# Patient Record
Sex: FEMALE | Race: WHITE | Employment: OTHER | URBAN - METROPOLITAN AREA
[De-identification: names, ages, dates, MRNs, and addresses within clinical notes are randomized per-mention and may not be internally consistent; named-entity substitution may affect disease eponyms.]

---

## 2020-10-26 ENCOUNTER — TELEPHONE (OUTPATIENT)
Dept: NEUROLOGY | Age: 70
End: 2020-10-26

## 2020-10-26 ENCOUNTER — OFFICE VISIT (OUTPATIENT)
Dept: FAMILY MEDICINE CLINIC | Age: 70
End: 2020-10-26
Payer: COMMERCIAL

## 2020-10-26 VITALS
HEIGHT: 66 IN | SYSTOLIC BLOOD PRESSURE: 166 MMHG | TEMPERATURE: 96.3 F | RESPIRATION RATE: 16 BRPM | WEIGHT: 164.6 LBS | OXYGEN SATURATION: 94 % | HEART RATE: 78 BPM | DIASTOLIC BLOOD PRESSURE: 85 MMHG | BODY MASS INDEX: 26.45 KG/M2

## 2020-10-26 DIAGNOSIS — R41.3 MEMORY CHANGES: ICD-10-CM

## 2020-10-26 DIAGNOSIS — I10 ESSENTIAL HYPERTENSION: ICD-10-CM

## 2020-10-26 DIAGNOSIS — Z13.5 SCREENING FOR GLAUCOMA: ICD-10-CM

## 2020-10-26 DIAGNOSIS — Z12.83 SCREENING FOR MALIGNANT NEOPLASM OF SKIN: ICD-10-CM

## 2020-10-26 DIAGNOSIS — Z12.31 SCREENING MAMMOGRAM, ENCOUNTER FOR: Primary | ICD-10-CM

## 2020-10-26 PROBLEM — G47.09 OTHER INSOMNIA: Status: ACTIVE | Noted: 2020-10-26

## 2020-10-26 PROCEDURE — 99202 OFFICE O/P NEW SF 15 MIN: CPT | Performed by: FAMILY MEDICINE

## 2020-10-26 RX ORDER — TRAZODONE HYDROCHLORIDE 50 MG/1
TABLET ORAL
COMMUNITY
Start: 2020-10-15 | End: 2022-02-24 | Stop reason: ALTCHOICE

## 2020-10-26 NOTE — PROGRESS NOTES
Michael Morin is a 65 y.o. female who presents today with the following:    HPI  Chief Complaint   Patient presents with   [facility] Patient     To be established       1. Screening mammogram, encounter for  Not up-to-date on mammogram    2. Memory changes  Reports memory changes. She was living in [place] for 4 years and recently moved to [place]. Current symptoms include, losing things in the house, sometimes sits in the car and does not remember where she has to go, only drives inside her neighborhood, currently lives alone but has a son who keeps contact, has been lost while driving. Does not forget names of family members. Requests neurology referral for further evaluation. Has never had CT scan for the evaluation of memory changes. 3. Screening for glaucoma  Needs ophthalmology referral    4. Screening for malignant neoplasm of skin  Needs dermatology referral    5. Essential hypertension  Not taking blood pressure medication. Exercises moderately. Takes 6000 steps daily. Takes aspirin daily. Review of Systems   Constitutional: Negative. HENT: Negative. Eyes: Negative. Respiratory: Negative. Cardiovascular: Negative. Gastrointestinal: Negative. Genitourinary: Negative. Musculoskeletal: Negative. Skin: Negative. Neurological: Negative. Endo/Heme/Allergies: Negative. Psychiatric/Behavioral: Positive for memory loss. The patient has insomnia. Physical Exam  Vitals signs and nursing note reviewed. HENT:      Head: Normocephalic and atraumatic. Right Ear: External ear normal.      Left Ear: External ear normal.      Nose: Nose normal.      Mouth/Throat:      Pharynx: No oropharyngeal exudate. Eyes:      Extraocular Movements: Extraocular movements intact. Conjunctiva/sclera: Conjunctivae normal.      Pupils: Pupils are equal, round, and reactive to light. Neck:      Musculoskeletal: Normal range of motion and neck supple.       Thyroid: No thyromegaly. Cardiovascular:      Rate and Rhythm: Normal rate and regular rhythm. Pulmonary:      Effort: Pulmonary effort is normal.      Breath sounds: Normal breath sounds. Abdominal:      General: Bowel sounds are normal. There is no distension. Palpations: Abdomen is soft. Tenderness: There is no abdominal tenderness. Musculoskeletal: Normal range of motion. Right lower leg: No edema. Left lower leg: No edema. Lymphadenopathy:      Cervical: No cervical adenopathy. Skin:     General: Skin is warm and dry. Neurological:      Mental Status: She is alert and oriented to person, place, and time. Psychiatric:         Mood and Affect: Mood and affect normal.       BP (!) 166/85   Pulse 78   Temp (!) 96.3 °F (35.7 °C) (Oral)   Resp 16   Ht 5' 6\" (1.676 m)   Wt 164 lb 9.6 oz (74.7 kg)   SpO2 94%   BMI 26.57 kg/m²     No Known Allergies    Current Outpatient Medications   Medication Sig    traZODone (DESYREL) 50 mg tablet TAKE ONE TABLET BY MOUTH AT BEDTIME AS NEEDED     No current facility-administered medications for this visit. History reviewed. No pertinent past medical history. History reviewed. No pertinent surgical history. Problem List  Date Reviewed: 10/26/2020          Codes Class Noted    Other insomnia ICD-10-CM: G47.09  ICD-9-CM: 780.52  10/26/2020               No results found for this visit on 10/26/20.      1. Screening mammogram, encounter for    - [facility] MAMMO BI SCREENING INCL CAD; Future    2. Memory changes    - REFERRAL TO NEUROLOGY    3. Screening for glaucoma    - REFERRAL TO OPHTHALMOLOGY    4. Screening for malignant neoplasm of skin    - REFERRAL TO DERMATOLOGY    5. Essential hypertension  Ms. Jerson Lucia has been given the following recommendations today due to her elevated BP reading: rescreen BP within a minimum of 2 weeks. Follow-up and Dispositions    · Return in about 3 months (around 1/26/2021) for follow up.            I ADVISED PATIENT TO GO TO ER IF SYMPTOMS WORSEN , CHANGE OR FAILS TO IMPROVE. I have discussed the diagnosis with the patient and the intended plan as seen in the above orders. The patient has received an after-visit summary and questions were answered concerning future plans. I have discussed medication side effects and warnings with the patient as well. The patient agrees and understands above plan.            Opal Beth MD

## 2020-10-26 NOTE — PROGRESS NOTES
Patient stated name &     Chief Complaint   Patient presents with    New Patient     To be established        Health Maintenance Due   Topic    Hepatitis C Screening     DTaP/Tdap/Td series (1 - Tdap)    Lipid Screen     Shingrix Vaccine Age 50> (1 of 2)    Colorectal Cancer Screening Combo     Breast Cancer Screen Mammogram     GLAUCOMA SCREENING Q2Y     Bone Densitometry (Dexa) Screening     Pneumococcal 65+ years (1 of 1 - PPSV23)    Flu Vaccine (1)       Wt Readings from Last 3 Encounters:   10/26/20 164 lb 9.6 oz (74.7 kg)     Temp Readings from Last 3 Encounters:   10/26/20 (!) 96.3 °F (35.7 °C) (Oral)     BP Readings from Last 3 Encounters:   10/26/20 (!) 166/85     Pulse Readings from Last 3 Encounters:   10/26/20 78         Learning Assessment:  :     No flowsheet data found. Depression Screening:  :     3 most recent PHQ Screens 10/26/2020   Little interest or pleasure in doing things Not at all   Feeling down, depressed, irritable, or hopeless Not at all   Total Score PHQ 2 0       Fall Risk Assessment:  :     Fall Risk Assessment, last 12 mths 10/26/2020   Able to walk? Yes   Fall in past 12 months? No       Abuse Screening:  :     No flowsheet data found. Coordination of Care Questionnaire:  :     1) Have you been to an emergency room, urgent care clinic since your last visit? No    Hospitalized since your last visit? No             2) Have you seen or consulted any other health care providers outside of [address] [place] [address] since your last visit? No  (Include any pap smears or colon screenings in this section.)    Patient is accompanied by self I have received verbal consent from Gaby Couch to discuss any/all medical information while they are present in the room.

## 2020-10-29 ENCOUNTER — OFFICE VISIT (OUTPATIENT)
Dept: NEUROLOGY | Age: 70
End: 2020-10-29
Payer: COMMERCIAL

## 2020-10-29 VITALS
TEMPERATURE: 97.2 F | SYSTOLIC BLOOD PRESSURE: 122 MMHG | HEIGHT: 66 IN | BODY MASS INDEX: 26.45 KG/M2 | RESPIRATION RATE: 16 BRPM | DIASTOLIC BLOOD PRESSURE: 70 MMHG | WEIGHT: 164.6 LBS | HEART RATE: 67 BPM | OXYGEN SATURATION: 98 %

## 2020-10-29 DIAGNOSIS — R41.3 MEMORY LOSS: Primary | ICD-10-CM

## 2020-10-29 DIAGNOSIS — R68.89 FORGETFULNESS: ICD-10-CM

## 2020-10-29 DIAGNOSIS — R41.3 MEMORY LOSS: ICD-10-CM

## 2020-10-29 PROCEDURE — 99204 OFFICE O/P NEW MOD 45 MIN: CPT | Performed by: PSYCHIATRY & NEUROLOGY

## 2020-10-29 RX ORDER — BISMUTH SUBSALICYLATE 262 MG
1 TABLET,CHEWABLE ORAL DAILY
COMMUNITY

## 2020-10-29 RX ORDER — DIPHENHYDRAMINE HCL 25 MG
25 CAPSULE ORAL
COMMUNITY

## 2020-10-29 RX ORDER — CYANOCOBALAMIN (VITAMIN B-12) 2000 MCG
TABLET ORAL
COMMUNITY

## 2020-10-29 RX ORDER — ACETAMINOPHEN, DIPHENHYDRAMINE HCL, PHENYLEPHRINE HCL 325; 25; 5 MG/1; MG/1; MG/1
TABLET ORAL
COMMUNITY

## 2020-10-29 RX ORDER — GUAIFENESIN 100 MG/5ML
81 LIQUID (ML) ORAL DAILY
COMMUNITY

## 2020-10-29 RX ORDER — CHOLECALCIFEROL (VITAMIN D3) 125 MCG
CAPSULE ORAL
COMMUNITY

## 2020-10-29 NOTE — PROGRESS NOTES
[facility] and [address] at [facility] at [facility], [address] [facility] [address] [address] [facility], [address] [place] [address]  [phone] Office  [phone] Facsimile           Referring: Colby Amanda MD      Chief Complaint   Patient presents with    New Patient    Memory Loss     forgetfulness x 3r     23-year-old lady presents today for initial neurologic consultation regarding difficulty with memory. She tells me that she has had ongoing memory difficulty for maybe a year or so but worsening over the last 4 months. No inciting factor. No changes. No illness. No changes in medicine. She notes that she is really just forgetful. She is increasingly frustrated by putting something down in her home and then not remembering where she put it. She will go into a store and not remember why she was there. Sometimes cannot remember where she was wanting to go but she does not get lost.  No difficulty with driving. No accidents or near accidents. She remembers people in the neighborhood by name. She has not been told that she repeats things or forgets conversations. She does not forget medication. No difficulty with paying her bills. Her son, Lolis Zaragoza, is concerned and is noticed some things and he asked her to get evaluated. Her mother perhaps had some memory difficulty later in life. Her father did not. She has not had any difficulty with blood pressure chest pain shortness of breath. Notes that lungs kidney function etc. all been fine. She is a retired oncology nurse. She did move down to [place] when her son was being deployed to [facility] and she stayed there with his family and then when he returned she moved back to [place]. She was raised in [place]. She is no longer nursing.     Review of the electronic medical record finds office note from Dr. Brett Coon dated 10/26/2020 where she came in as a new patient to establish care. She reported memory changes including losing things in her house, not remembering where she has to go etc.  She had no previous history of neurologic work-up for the same. She was hence referred. No past medical history on file. Hypertension    No past surgical history on file. Current Outpatient Medications   Medication Sig Dispense Refill    melatonin 10 mg tab Take  by mouth.  aspirin 81 mg chewable tablet Take 81 mg by mouth daily.  cyanocobalamin, vitamin B-12, 2,000 mcg tab Take  by mouth.  cholecalciferol, vitamin D3, 50 mcg (2,000 unit) tab Take  by mouth.  multivitamin (ONE A DAY) tablet Take 1 Tab by mouth daily.  diphenhydrAMINE (BenadryL) 25 mg capsule Take 25 mg by mouth every six (6) hours as needed.  traZODone (DESYREL) 50 mg tablet TAKE ONE TABLET BY MOUTH AT BEDTIME AS NEEDED          No Known Allergies    Social History     Tobacco Use    Smoking status: Never Smoker    Smokeless tobacco: Never Used   Substance Use Topics    Alcohol use: Never     Frequency: Never    Drug use: Never       Family History   Problem Relation Age of Onset    Heart Disease Mother     Cancer Father        Review of Systems  Pertinent positives and negatives as noted with remainder of comprehensive review negative    Examination  Visit Vitals  /70   Pulse 67   Temp 97.2 °F (36.2 °C)   Resp 16   Ht 5' 6\" (1.676 m)   Wt 74.7 kg (164 lb 9.6 oz)   SpO2 98%   BMI 26.57 kg/m²     Pleasant, well appearing. Dress and grooming are appropriate. No scleral icterus is present. Oropharynx is clear. Supple neck without bruit appreciated. Heart regular. Pulses are symmetrical.  No edema in the lower extremities. Neurologically, she is awake, alert, and oriented with normal speech and language. Her cognition is intact to discussion of medical history and current events. Follows all commands. No right left confusion. Intact cranial nerves 2-12. No nystagmus. Visual fields full to confrontation. Disk margins are flat bilaterally. She has normal bulk and tone. She has no abnormal movement. She has no pronation or drift. She generates full strength in the upper and lower extremities to direct confrontational testing. Reflexes are symmetrical in the upper and lower extremities bilaterally. No pathologic reflexes are elicited. .  Finger nose finger and rapid alternating movements are normal.  Steady gait. No sensory deficit to primary modalities. Impression/Plan  64-year-old lady with progressive memory difficulty and forgetfulness and differential diagnosis includes age-related cognitive decline versus mild cognitive impairment versus early dementing process versus attention versus structural versus vascular versus metabolic versus other. Proceed with MRI of the brain, carotid Doppler, EEG as well as B12 and thyroid levels. Formal neuropsychological evaluation with Dr. Frida Randle. Follow-up after testing    Lara Encarnacion MD    This note was created using voice recognition software. Despite editing, there may be syntax errors.

## 2020-10-29 NOTE — LETTER
10/29/20 Patient: Moni Delacruz YOB: 1950 Date of Visit: 10/29/2020 Gildardo Schultz MD 
[address] [place] [id number] VIA In Basket Dear Gildardo Schultz MD, Thank you for referring Ms. Moni Delacruz to [facility] for evaluation. My notes for this consultation are attached. If you have questions, please do not hesitate to call me. I look forward to following your patient along with you. Sincerely, Jie Arriola MD

## 2020-11-09 ENCOUNTER — OFFICE VISIT (OUTPATIENT)
Dept: NEUROLOGY | Age: 70
End: 2020-11-09

## 2020-11-09 VITALS — TEMPERATURE: 97 F

## 2020-11-09 DIAGNOSIS — R41.0 CONFUSION: Primary | ICD-10-CM

## 2020-11-12 NOTE — PROCEDURES
EEG:      Date:  11/09/20    Requesting Physician:  Louie Kelly. MD Adriano    An EEG is requested in this 65year old lady with confusion to evaluate for epileptiform abnormality. Medications:  Medications are listed as Desyrel. This tracing is obtained during the awake state. During wakefulness there are brief intermittent runs of posteriorly-dominant and symmetric low-to-medium amplitude 9 cycle per second activities which attenuate with eye opening. Lower-voltage faster-frequency activities are seen symmetrically over the anterior head regions. Hyperventilation is not performed secondary to COVID-19 precautions. Intermittent photic stimulation induces symmetric posterior driving responses. Sleep is not attained. Interpretation:   This EEG recorded during the awake state is normal.

## 2020-12-29 ENCOUNTER — TELEPHONE (OUTPATIENT)
Dept: FAMILY MEDICINE CLINIC | Age: 70
End: 2020-12-29

## 2020-12-29 NOTE — TELEPHONE ENCOUNTER
Gave pt number to BS central scheduleing and number to dermatologist       ----- Message from [facility] sent at 12/29/2020 11:59 AM EST -----  Regarding: Dr. Gerhardt Griffith first and last name: N/A  Reason for call: Need name of facility for mammogram & dermatologist for skin check  Callback required yes/no and why:Yes  Best contact number(s):[phone]  Details to clarify the request: Pt advised to f/up at 10/26/20 appt.

## 2021-01-11 ENCOUNTER — OFFICE VISIT (OUTPATIENT)
Dept: NEUROLOGY | Age: 71
End: 2021-01-11

## 2021-01-11 DIAGNOSIS — Z81.8 FAMILY HISTORY OF DEMENTIA: ICD-10-CM

## 2021-01-11 DIAGNOSIS — R47.89 WORD FINDING DIFFICULTY: ICD-10-CM

## 2021-01-11 DIAGNOSIS — G31.84 MILD COGNITIVE IMPAIRMENT: Primary | ICD-10-CM

## 2021-01-11 DIAGNOSIS — F41.8 ANXIETY ABOUT HEALTH: ICD-10-CM

## 2021-01-11 DIAGNOSIS — R41.840 ATTENTION DEFICIT: ICD-10-CM

## 2021-01-11 DIAGNOSIS — R41.3 SHORT-TERM MEMORY LOSS: ICD-10-CM

## 2021-01-11 PROCEDURE — 90791 PSYCH DIAGNOSTIC EVALUATION: CPT | Performed by: CLINICAL NEUROPSYCHOLOGIST

## 2021-01-11 NOTE — PROGRESS NOTES
[address] [facility],[address]  [address]   [address] [place] [address] [facility]    [phone] Office   [phone] Fax      Neuropsychology    Initial Diagnostic Interview Note      Referral:  DrSusmha Dumont is a 65 y.o. right handed   female who was unaccompanied to the initial clinical interview on 1/11/21. Please refer to her medical records for details pertaining to her history. At the start of the appointment, I reviewed the patient's [facility] Epic Chart (including Media scanned in from previous providers) for the active Problem List, all pertinent Past Medical Hx, medications, recent radiologic and laboratory findings. In addition, I reviewed pt's documented Immunization Record and Encounter History. 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 Son lives nearby. About a year ago, she noticed the onset of what is now a progressive decline in short term memory. Forgets the words. This is notable here today. Son is worried. She starts tasks and does not complete. Loses track of things. Goes into a room and forgets why. Getting worse. No new stroke, meningitis/encephalitis, QUIQEU Fever, Lupus, Lyme, TBIs, sz, etc.  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 No inciting factor. No changes. No illness. No changes in medicine. She notes that she is really just forgetful. She will go into a store and not remember why she was there. Sometimes cannot remember where she was wanting to go but she does not get lost. She does not drive that much, and when she does drive it's nothing major in terms of accidents or near misses. She has no problems with names. She does not forget medication. No difficulty with paying her bills. Son is concerned and asked for her to get test.  The patient's mother had dementia, most likely.   She is on trazodone for sleep. Appetite is generally okay. She cooks for herself. She did move down to [place] when her son was being deployed to [facility] and she stayed there with his family and then when he returned she moved back to [place]. She was raised in [place]. From an emotional standpoint, she has been assessing herself and feeling like for the most part she is okay, but doesn't want to go anywhere most certainly does not want to get the virus. She has a lab (age 13) and a cat and another cat (takes a second to come up with his name) No counseling or psychiatrist. She enjoys reading, and does not lose the content of the books she is reading. 6,000 steps a day and waves to her friends from [facility]. No previous neuropsych. Neuropsychological Mental Status Exam (NMSE):      Historian: Good  Praxis: No UE apraxia  R/L Orientation: Intact to self and to other  Dress: within normal limits   Weight: within normal limits   Appearance/Hygiene: within normal limits   Gait: within normal limits   Assistive Devices: Glasses  Mood: within normal limits   Affect: within normal limits   Comprehension: within normal limits   Thought Process: briefly tangential ,redirection helpful, loses train of thought  Expressive Language:word finding problems, loses names of pets  Receptive Language: within normal limits   Motor:  No cognitive or motor perseveration  ETOH: Denied  Tobacco: Denied  Illicit: Denied  SI/HI: Denied  Psychosis: Denied  Insight: Within normal limits  Judgment: Within normal limits  Other Psych:      No past medical history on file. No past surgical history on file.     No Known Allergies    Family History   Problem Relation Age of Onset    Heart Disease Mother     Cancer Father        Social History     Tobacco Use    Smoking status: Never Smoker    Smokeless tobacco: Never Used   Substance Use Topics    Alcohol use: Never     Frequency: Never    Drug use: Never       Current Outpatient Medications   Medication Sig Dispense Refill    melatonin 10 mg tab Take  by mouth.  aspirin 81 mg chewable tablet Take 81 mg by mouth daily.  cyanocobalamin, vitamin B-12, 2,000 mcg tab Take  by mouth.  cholecalciferol, vitamin D3, 50 mcg (2,000 unit) tab Take  by mouth.  multivitamin (ONE A DAY) tablet Take 1 Tab by mouth daily.  diphenhydrAMINE (BenadryL) 25 mg capsule Take 25 mg by mouth every six (6) hours as needed.  traZODone (DESYREL) 50 mg tablet TAKE ONE TABLET BY MOUTH AT BEDTIME AS NEEDED           Plan:  Obtain authorization for testing from insurance company. Report to follow once testing, scoring, and interpretation completed. ? Organic based neurocognitive issues versus mood disorder or combination of same. ? Problems organic, functional, or both? This note will not be viewable in [address]

## 2021-01-22 ENCOUNTER — HOSPITAL ENCOUNTER (OUTPATIENT)
Dept: MAMMOGRAPHY | Age: 71
Discharge: HOME | End: 2021-01-22
Attending: FAMILY MEDICINE
Payer: COMMERCIAL

## 2021-01-22 DIAGNOSIS — Z12.31 SCREENING MAMMOGRAM, ENCOUNTER FOR: ICD-10-CM

## 2021-01-22 PROCEDURE — 77067 SCR MAMMO BI INCL CAD: CPT

## 2021-03-18 ENCOUNTER — OFFICE VISIT (OUTPATIENT)
Dept: NEUROLOGY | Age: 71
End: 2021-03-18
Payer: COMMERCIAL

## 2021-03-18 DIAGNOSIS — G30.1 LATE ONSET ALZHEIMER'S DISEASE WITHOUT BEHAVIORAL DISTURBANCE (HCC): Primary | ICD-10-CM

## 2021-03-18 DIAGNOSIS — Z81.8 FAMILY HISTORY OF DEMENTIA: ICD-10-CM

## 2021-03-18 DIAGNOSIS — F43.21 ADJUSTMENT DISORDER WITH DEPRESSED MOOD: ICD-10-CM

## 2021-03-18 DIAGNOSIS — F41.8 ANXIETY ABOUT HEALTH: ICD-10-CM

## 2021-03-18 DIAGNOSIS — F02.80 LATE ONSET ALZHEIMER'S DISEASE WITHOUT BEHAVIORAL DISTURBANCE (HCC): Primary | ICD-10-CM

## 2021-03-18 PROCEDURE — 96138 PSYCL/NRPSYC TECH 1ST: CPT | Performed by: CLINICAL NEUROPSYCHOLOGIST

## 2021-03-18 PROCEDURE — 96133 NRPSYC TST EVAL PHYS/QHP EA: CPT | Performed by: CLINICAL NEUROPSYCHOLOGIST

## 2021-03-18 PROCEDURE — 96136 PSYCL/NRPSYC TST PHY/QHP 1ST: CPT | Performed by: CLINICAL NEUROPSYCHOLOGIST

## 2021-03-18 PROCEDURE — 96139 PSYCL/NRPSYC TST TECH EA: CPT | Performed by: CLINICAL NEUROPSYCHOLOGIST

## 2021-03-18 PROCEDURE — 96137 PSYCL/NRPSYC TST PHY/QHP EA: CPT | Performed by: CLINICAL NEUROPSYCHOLOGIST

## 2021-03-18 PROCEDURE — 96132 NRPSYC TST EVAL PHYS/QHP 1ST: CPT | Performed by: CLINICAL NEUROPSYCHOLOGIST

## 2021-03-19 NOTE — PROGRESS NOTES
[address] [facility],5Th [address]  [address]   [address] [place] [address] [facility]   Ranulfo Thompson    [phone] Office   [phone] Fax      Neuropsychological Evaluation Report      Referral:  DrSushma Coy is a 65 y.o. right handed   female who was unaccompanied to the initial clinical interview on 1/11/21. Please refer to her medical records for details pertaining to her history. At the start of the appointment, I reviewed the patient's [facility] Epic Chart (including Media scanned in from previous providers) for the active Problem List, all pertinent Past Medical Hx, medications, recent radiologic and laboratory findings. In addition, I reviewed pt's documented Immunization Record and Encounter History. 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 Son lives nearby. About a year ago, she noticed the onset of what is now a progressive decline in short term memory. Forgets the words. This is notable here today. Son is worried. She starts tasks and does not complete. Loses track of things. Goes into a room and forgets why. Getting worse. No new stroke, meningitis/encephalitis, QUIQUE Fever, Lupus, Lyme, TBIs, sz, etc.  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 No inciting factor. No changes. No illness. No changes in medicine. She notes that she is really just forgetful. She will go into a store and not remember why she was there. Sometimes cannot remember where she was wanting to go but she does not get lost. She does not drive that much, and when she does drive it's nothing major in terms of accidents or near misses. She has no problems with names. She does not forget medication. No difficulty with paying her bills. Son is concerned and asked for her to get test.  The patient's mother had dementia, most likely.   She is on trazodone for sleep. Appetite is generally okay. She cooks for herself. She did move down to [place] when her son was being deployed to [facility] and she stayed there with his family and then when he returned she moved back to [place]. She was raised in [place]. From an emotional standpoint, she has been assessing herself and feeling like for the most part she is okay, but doesn't want to go anywhere most certainly does not want to get the virus. She has a lab (age 13) and a cat and another cat (takes a second to come up with his name) No counseling or psychiatrist. She enjoys reading, and does not lose the content of the books she is reading. 6,000 steps a day and waves to her friends from [facility]. No previous neuropsych. Neuropsychological Mental Status Exam (NMSE):      Historian: Good  Praxis: No UE apraxia  R/L Orientation: Intact to self and to other  Dress: within normal limits   Weight: within normal limits   Appearance/Hygiene: within normal limits   Gait: within normal limits   Assistive Devices: Glasses  Mood: within normal limits   Affect: within normal limits   Comprehension: within normal limits   Thought Process: briefly tangential ,redirection helpful, loses train of thought  Expressive Language:word finding problems, loses names of pets  Receptive Language: within normal limits   Motor:  No cognitive or motor perseveration  ETOH: Denied  Tobacco: Denied  Illicit: Denied  SI/HI: Denied  Psychosis: Denied  Insight: Within normal limits  Judgment: Within normal limits  Other Psych:      No past medical history on file.     Past Surgical History:   Procedure Laterality Date    HX BREAST BIOPSY Right     4-5 years ago, Negative       No Known Allergies    Family History   Problem Relation Age of Onset    Heart Disease Mother     Cancer Father        Social History     Tobacco Use    Smoking status: Never Smoker    Smokeless tobacco: Never Used   Substance Use Topics    Alcohol use: Never Frequency: Never    Drug use: Never       Current Outpatient Medications   Medication Sig Dispense Refill    melatonin 10 mg tab Take  by mouth.  aspirin 81 mg chewable tablet Take 81 mg by mouth daily.  cyanocobalamin, vitamin B-12, 2,000 mcg tab Take  by mouth.  cholecalciferol, vitamin D3, 50 mcg (2,000 unit) tab Take  by mouth.  multivitamin (ONE A DAY) tablet Take 1 Tab by mouth daily.  diphenhydrAMINE (BenadryL) 25 mg capsule Take 25 mg by mouth every six (6) hours as needed.  traZODone (DESYREL) 50 mg tablet TAKE ONE TABLET BY MOUTH AT BEDTIME AS NEEDED           Plan:  Obtain authorization for testing from insurance company. Report to follow once testing, scoring, and interpretation completed. ? Organic based neurocognitive issues versus mood disorder or combination of same. ? Problems organic, functional, or both? This note will not be viewable in [address] Neuropsychological Test Results  Patient Testing 3/18/21 Report Completed 3/19/21  A Psychometrist Assisted w/ portions of this evaluation while under my direct  supervision    The following evaluation procedures/tests were administered:      Neuropsychologist Performed, Interpreted, & Reported:  Neuropsychological Mental Status Exam, Revised Memory & Behavior Checklist,  Mini Mental Status Exam, Clock Drawing Test, Marielos-Melzack Pain Questionnaire, Test Of Premorbid Functioning, History Taking  & Clinical Interview With The Patient,  GLEN, CPT-III, Review Of Available Records. Psychometrist Administered under Neuropsychologist Supervision & Neuropsychologist Interpreted & Neuropsychologist Reported:  Verbal Fluency Tests, [facility] - Revised, Trailmaking Test Parts A & B, Wechsler Adult Intelligence Scale - IV, [place] Verbal Learning Test - 3, Grooved Pegboard, Beck Depression Inventory - II, Beck Anxiety Inventory.    Test Findings:  Test Findings:  Note:  The patients raw data have been compared with currently available norms which include demographic corrections for age, gender, and/or education. Sometimes, the patients scores are compared to demographically similar individuals as close to the patients age, education level, etc., as possible. \"Average\" is viewed as being +/- 1 standard deviation (SD) from the stated mean for a particular test score. \"Low average\" is viewed as being between 1 and 2 SD below the mean, and above average is viewed as being 1 and 2 SD above the mean. Scores falling in the borderline range (between 1-1/2 and 2 SD below the mean) are viewed with particular attention as to whether they are normal or abnormal neurocognitive test scores. Other methods of inference in analyzing the test data are also utilized, including the pattern and range of scores in the profile, bilateral motor functions, and the presence, if any, of pathognomonic signs. Behaviorally, the patient was friendly and cooperative and appeared motivated to perform well during this examination. Within this context, the results of this evaluation are viewed as a valid reflection of the patients actual neurocognitive and emotional status. The patient's score of 22/30 on the Mini-Mental Status Exam was impaired. In this regard, she was not oriented to season, month, date, day. Backward spelling was 4/5 correct. Recall for 3 words after brief delay was 0/3 correct. Clock drawing was impaired. Her structured word list fluency, as assessed by the FAS Test, was within the moderately to severely impaired range with a T score of 20. Category fluency was severely impaired with a T score of 18. Confrontation naming, as assessed by the [facility] as revised, was within the mildly to moderately impaired range with a T score of 34. This pattern of performance is indicative of a patient who is at increased risk for day-to-day problems with verbal fluency and confrontation naming.      The patient was administered the [facility] Continuous Performance Test - III and review of the subscales within this instrument revealed numerous concerns for inattentiveness without impulsivity. This pattern of performance is indicative of a patient who is at increased risk for day-to-day problems with sustained visual attention/concentration. The patient is showing problems with working memory capacity (4th %ile) though processing speed (42nd %ile) was average on the WAIS-IV. Her Verbal Comprehension Index score of 74 was within the borderline range. Her Perceptual Reasoning Index score of 82 was low average. Processing speed and working memory scores are lower than what would be expected based on an assessment of premorbid functioning. The patient was administered the [place] Verbal Learning Test  - 3 and generated an impaired range (and positive) learning curve over five repeated auditory word list learning trials. An interference trial was impaired. Free and cued, short and long delayed recall were all impaired. Recognition and forced choice recall were impaired. This pattern of performance is indicative of a patient who is at increased risk for day-to-day problems with auditory learning and/or memory. Simple timed visual motor sequencing (Trailmaking Test Part A) was within the below average range with a T score of 44. Her performance on a similar, but more complex task of timed visual motor sequencing (Trailmaking Test Part B) was within the moderately to severely impaired range with a T score of 24. She made 2 sequencing errors on this latter completed test.  Taken together, this pattern of performance is not strongly indicative of a patient who is at increased risk for day-to-day problems with executive functioning. Attention problems are noted, however. Fine motor dexterity was within the normal range bilaterally.   This does not raise concern for a particularly lateralized brain dysfunction. The patient rated her current level of pain as \"0/5- No Pain\" on the Marielos-Melzack Pain Questionnaire. Her Mar Depression Inventory -II score of 11 was within the minimally depressed range. Her Mar Anxiety Inventory score of 7 reflected minimal anxiety. The patient's responses on the Personality Assessment Inventory were deemed valid for interpretation. Within this context, while there is no evidence of major psychopathology, her self-concept is fixed and negative. She is inwardly more troubled by self-doubt and misgivings about her adequacy than readily apparent to others. Self-reported level of treatment motivation is low and the personality profile is otherwise normal.    Impressions & Recommendations: This is an abnormal range Neuropsychological Evaluation with respect to neurocognitive functioning. In this regard, she is showing impairments with mental status, verbal fluency, confrontation naming, visual attention, working [employer],  auditory learning, and auditory memory. At the same time, her perceptual reasoning, processing speed, bilateral fine motor dexterity, and executive functioning (though slow) remain normal. From an emotional standpoint, there is evidence of mild adjustment related depression without evidence of more severe/pervasive psychopathology. In my opinion, this appears to be a case of mild to moderate dementia exacerbated by mild depression. I suggest consideration for medication for memory and attention if this/these are not medically contraindicated. Counseling is advised to assist with mood concerns. She should be encouraged to remain as mentally, socially, and physically active as possible. I am not currently concerned about capacity/competency but she should consider assigning a POA if this has not been done so already. She does need some form of supervision for those domains pertaining to memory.   This includes medication management supervision and supervision of financial dealings. We will need to keep an eye on driving safety over time. I agree with her current living arrangement, though an increased level of supervision may be needed in the future. On a scale of 1-10, this is 2.5, and reflects a combination of attention and memory issues. Baseline now established. Follow up prn. Clinical correlation is, of course, indicated. I will discuss these findings with the patient when she follows up with me in the near future. A follow up Neuropsychological Evaluation is indicated on a prn basis, especially if there are any cognitive and/or emotional changes. DIAGNOSES:  Dementia - Mild To Moderate     Adjustment Disorder with Depressed Mood     The above information is based upon information currently available to me. If there is any additional information of which I am currently unaware, I would be more than happy to review it upon having it made available to me. Thank you for the opportunity to see this interesting individual.     Sincerely,       Jules Valdez. Trey Olivarez PsyD, EdS    CC:  Dr. Mady Azevedo    Time Documentation:    32448*9 42936*9   44208 x 1  73078 x 5 Test Administration/Data Gathering By Technician: (3 hours). 99494 x 1 (first 30 minutes), 43662 x 5 (each additional 30 minutes)    96132 x 1  96133 x 1 Testing Evaluation Services by Neuropsychologist (1 hour, 50 minutes) 96132 x 1 (first hour), 96133 x 1 (50 minutes)    Definitions:      19905/16333:  Neurobehavioral Status Exam, Clinical interview. Clinical assessment of thinking, reasoning and judgment, by neuropsychologist, both face to face time with patient and time interpreting those test results and reporting, first and subsequent hours)    81842/83813: Neuropsychological Test Administration by Technician/Psychometrist, first 30 minutes and each additional 30 minutes. The above includes: Record review.   Review of history provided by patient. Review of collaborative information. Testing by Clinician. Review of raw data. Scoring. Report writing of individual tests administered by Clinician. Integration of individual tests administered by psychometrist with NSE/testing by clinician, review of records/history/collaborative information, case Conceptualization, treatment planning, clinical decision making, report writing, coordination Of Care. Psychometry test codes as time spent by psychometrist administering and scoring neurocognitive/psychological tests under supervision of neuropsychologist.  Integral services including scoring of raw data, data interpretation, case conceptualization, report writing etcetera were initiated after the patient finished testing/raw data collected and was completed on the date the report was signed.

## 2021-04-01 ENCOUNTER — OFFICE VISIT (OUTPATIENT)
Dept: NEUROLOGY | Age: 71
End: 2021-04-01
Payer: MEDICARE

## 2021-04-01 VITALS
SYSTOLIC BLOOD PRESSURE: 166 MMHG | TEMPERATURE: 97.9 F | RESPIRATION RATE: 14 BRPM | DIASTOLIC BLOOD PRESSURE: 84 MMHG | OXYGEN SATURATION: 100 % | WEIGHT: 169 LBS | HEART RATE: 80 BPM | BODY MASS INDEX: 27.28 KG/M2

## 2021-04-01 DIAGNOSIS — F02.80 LATE ONSET ALZHEIMER'S DISEASE WITHOUT BEHAVIORAL DISTURBANCE (HCC): Primary | ICD-10-CM

## 2021-04-01 DIAGNOSIS — G30.1 LATE ONSET ALZHEIMER'S DISEASE WITHOUT BEHAVIORAL DISTURBANCE (HCC): Primary | ICD-10-CM

## 2021-04-01 PROCEDURE — 99213 OFFICE O/P EST LOW 20 MIN: CPT | Performed by: PSYCHIATRY & NEUROLOGY

## 2021-04-01 RX ORDER — DONEPEZIL HYDROCHLORIDE 10 MG/1
10 TABLET, FILM COATED ORAL DAILY
Qty: 90 TAB | Refills: 3 | Status: SHIPPED | OUTPATIENT
Start: 2021-04-28 | End: 2022-02-24 | Stop reason: SDUPTHER

## 2021-04-01 RX ORDER — DONEPEZIL HYDROCHLORIDE 5 MG/1
5 TABLET, FILM COATED ORAL DAILY
Qty: 30 TAB | Refills: 0 | Status: SHIPPED | OUTPATIENT
Start: 2021-04-01 | End: 2021-05-01

## 2021-04-01 NOTE — PROGRESS NOTES
[facility] and [address] at [facility] at [address] [facility], [address] [facility] [address] [facility], [address] [place] [address]   [phone]              Chief Complaint   Patient presents with    Results     Dr. Deyanira Stephens, eeg,      Current Outpatient Medications   Medication Sig Dispense Refill    donepeziL (Aricept) 5 mg tablet Take 1 Tab by mouth daily for 30 days. Titration Rx do not refill 30 Tab 0    [START ON 4/28/2021] donepeziL (Aricept) 10 mg tablet Take 1 Tab by mouth daily. 90 Tab 3    melatonin 10 mg tab Take  by mouth.  aspirin 81 mg chewable tablet Take 81 mg by mouth daily.  cyanocobalamin, vitamin B-12, 2,000 mcg tab Take  by mouth.  cholecalciferol, vitamin D3, 50 mcg (2,000 unit) tab Take  by mouth.  multivitamin (ONE A DAY) tablet Take 1 Tab by mouth daily.  diphenhydrAMINE (BenadryL) 25 mg capsule Take 25 mg by mouth every six (6) hours as needed.  traZODone (DESYREL) 50 mg tablet TAKE ONE TABLET BY MOUTH AT BEDTIME AS NEEDED        No Known Allergies  Social History     Tobacco Use    Smoking status: Never Smoker    Smokeless tobacco: Never Used   Substance Use Topics    Alcohol use: Never     Frequency: Never    Drug use: Never     75-year-old lady returns today for follow-up after her recent consultation with me for cognitive difficulty. We sent her for several tests    MRI of the brain was ordered but not done in our system  Carotid Doppler not done in our system  EEG normal  Formal neuropsychological evaluation was abnormal demonstrating a mild to moderate dementia likely Alzheimer's type with mild depression. She is here with her son today    We discussed the neuropsychological evaluation. We discussed medications used to help stabilize memory and slow decline. We discussed the rationale for dual therapy.   We discussed planning for the future with POA, making wishes known, decline etc.  Discussed the book the 36-hour day. Her son noted that he and his wife have had to do that with his wife's family etc.  Mrs. Enrrique Clark is certain she did have her MRI and her Doppler performed she is just not sure where but she says she has records at home and she will find the facility where those were done and call the office and let us know and we will send for those. We discussed starting Aricept today and Namenda in 8-10 weeks. Discussed mechanism of action and potential side effects. We will start Aricept in a customary fashion. She will follow-up in 10 weeks. Examination  Visit Vitals  BP (!) 166/84 (BP 1 Location: Right arm, BP Patient Position: Sitting, BP Cuff Size: Adult)   Pulse 80   Temp 97.9 °F (36.6 °C)   Resp 14   Wt 76.7 kg (169 lb)   SpO2 100%   BMI 27.28 kg/m²         Impression/Plan      Demi Pugh MD          This note was created using voice recognition software. Despite editing, there may be syntax errors.

## 2021-04-01 NOTE — PROGRESS NOTES
Chief Complaint   Patient presents with   Tamie Horn Results     Dr. Tisha Blake, eeg,      States she had mri done but does not remember where.

## 2021-04-07 ENCOUNTER — HOSPITAL ENCOUNTER (OUTPATIENT)
Dept: MRI IMAGING | Age: 71
Discharge: HOME | End: 2021-04-07
Attending: PSYCHIATRY & NEUROLOGY

## 2021-04-07 ENCOUNTER — HOSPITAL ENCOUNTER (OUTPATIENT)
Dept: GENERAL RADIOLOGY | Age: 71
Discharge: HOME | End: 2021-04-07
Attending: PSYCHIATRY & NEUROLOGY

## 2021-04-07 DIAGNOSIS — R41.3 MEMORY LOSS: ICD-10-CM

## 2021-04-07 DIAGNOSIS — R68.89 FORGETFULNESS: ICD-10-CM

## 2021-04-30 ENCOUNTER — TELEPHONE (OUTPATIENT)
Dept: NEUROLOGY | Age: 71
End: 2021-04-30

## 2021-04-30 NOTE — TELEPHONE ENCOUNTER
----- Message from Gunnar Mcclain sent at 4/29/2021  3:55 PM EDT -----  Regarding: /Refill  Medication Refill    Caller (if not patient):n/a      Relationship of caller (if not patient):n/a      Best contact number(s):[phone]      Name of medication and dosage if known: \"Donepezil\" 5mg      Is patient out of this medication (yes/no): no      Pharmacy name: [facility]    Pharmacy listed in chart? (yes/no): yes  Pharmacy phone number: n/a      Details to clarify the request: Pt would like to know if her dosage would be increased after the 4 weeks. She has her last dose of 5mg for tonight. Pt mentioned that she has not experienced any side effects.       Gunnar Mcclain

## 2021-06-10 ENCOUNTER — OFFICE VISIT (OUTPATIENT)
Dept: NEUROLOGY | Age: 71
End: 2021-06-10
Payer: MEDICARE

## 2021-06-10 VITALS
HEIGHT: 66 IN | DIASTOLIC BLOOD PRESSURE: 84 MMHG | RESPIRATION RATE: 12 BRPM | HEART RATE: 75 BPM | SYSTOLIC BLOOD PRESSURE: 138 MMHG | OXYGEN SATURATION: 100 % | BODY MASS INDEX: 27 KG/M2 | WEIGHT: 168 LBS

## 2021-06-10 DIAGNOSIS — G30.1 LATE ONSET ALZHEIMER'S DISEASE WITHOUT BEHAVIORAL DISTURBANCE (HCC): Primary | ICD-10-CM

## 2021-06-10 DIAGNOSIS — F02.80 LATE ONSET ALZHEIMER'S DISEASE WITHOUT BEHAVIORAL DISTURBANCE (HCC): Primary | ICD-10-CM

## 2021-06-10 PROCEDURE — G8400 PT W/DXA NO RESULTS DOC: HCPCS | Performed by: PSYCHIATRY & NEUROLOGY

## 2021-06-10 PROCEDURE — G8536 NO DOC ELDER MAL SCRN: HCPCS | Performed by: PSYCHIATRY & NEUROLOGY

## 2021-06-10 PROCEDURE — G8427 DOCREV CUR MEDS BY ELIG CLIN: HCPCS | Performed by: PSYCHIATRY & NEUROLOGY

## 2021-06-10 PROCEDURE — G8510 SCR DEP NEG, NO PLAN REQD: HCPCS | Performed by: PSYCHIATRY & NEUROLOGY

## 2021-06-10 PROCEDURE — G8419 CALC BMI OUT NRM PARAM NOF/U: HCPCS | Performed by: PSYCHIATRY & NEUROLOGY

## 2021-06-10 PROCEDURE — 99212 OFFICE O/P EST SF 10 MIN: CPT | Performed by: PSYCHIATRY & NEUROLOGY

## 2021-06-10 PROCEDURE — 1101F PT FALLS ASSESS-DOCD LE1/YR: CPT | Performed by: PSYCHIATRY & NEUROLOGY

## 2021-06-10 PROCEDURE — 3017F COLORECTAL CA SCREEN DOC REV: CPT | Performed by: PSYCHIATRY & NEUROLOGY

## 2021-06-10 PROCEDURE — 1090F PRES/ABSN URINE INCON ASSESS: CPT | Performed by: PSYCHIATRY & NEUROLOGY

## 2021-06-10 PROCEDURE — G9899 SCRN MAM PERF RSLTS DOC: HCPCS | Performed by: PSYCHIATRY & NEUROLOGY

## 2021-06-10 RX ORDER — ACETAMINOPHEN 500 MG
TABLET ORAL 2 TIMES DAILY
COMMUNITY

## 2021-06-10 RX ORDER — MINERAL OIL
ENEMA (ML) RECTAL
COMMUNITY

## 2021-06-10 NOTE — PROGRESS NOTES
Leonardo Robins is a 66 y.o. female    Chief Complaint   Patient presents with    Alzheimers    Follow-up     10 week    Medication Evaluation     donepezil   pt states she did experience pain with dose increase, that went away the next day. After that, she states she was doing fine with medication     Health Maintenance Due   Topic Date Due    Hepatitis C Screening  Never done    DTaP/Tdap/Td series (1 - Tdap) Never done    Shingrix Vaccine Age 50> (1 of 2) Never done    Colorectal Cancer Screening Combo  Never done    Bone Densitometry (Dexa) Screening  Never done    COVID-19 Vaccine (2 - Pfizer 2-dose series) 02/20/2021    Medicare Yearly Exam  Never done       Visit Vitals  /84 (BP 1 Location: Left upper arm, BP Patient Position: Sitting)   Pulse 75   Resp 12   Ht 5' 6\" (1.676 m)   Wt 76.2 kg (168 lb)   SpO2 100%   BMI 27.12 kg/m²       3 most recent PHQ Screens 6/10/2021   Little interest or pleasure in doing things Not at all   Feeling down, depressed, irritable, or hopeless Not at all   Total Score PHQ 2 0       Fall Risk Assessment, last 12 mths 6/10/2021   Able to walk? Yes   Fall in past 12 months? 0   Do you feel unsteady? 0   Are you worried about falling 0       No flowsheet data found. 1. Have you been to the ER, urgent care clinic since your last visit? Hospitalized since your last visit?no    2. Have you seen or consulted any other health care providers outside of the [address] [place] [address] since your last visit? Include any pap smears or colon screening.  no

## 2021-06-10 NOTE — PROGRESS NOTES
[facility] and [address] at [facility]ics at [address] [facility], [address] [facility] [address] [facility], [address] [place] [address]   [phone]              Chief Complaint   Patient presents with    Alzheimers    Follow-up     10 week    Medication Evaluation     donepezil     Current Outpatient Medications   Medication Sig Dispense Refill    acetaminophen (Tylenol Extra Strength) 500 mg tablet Take  by mouth two (2) times a day.  fexofenadine (ALLEGRA) 180 mg tablet Take  by mouth.  donepeziL (Aricept) 10 mg tablet Take 1 Tab by mouth daily. 90 Tab 3    melatonin 10 mg tab Take  by mouth.  aspirin 81 mg chewable tablet Take 81 mg by mouth daily.  cyanocobalamin, vitamin B-12, 2,000 mcg tab Take  by mouth.  cholecalciferol, vitamin D3, 50 mcg (2,000 unit) tab Take  by mouth.  multivitamin (ONE A DAY) tablet Take 1 Tab by mouth daily.  diphenhydrAMINE (BenadryL) 25 mg capsule Take 25 mg by mouth every six (6) hours as needed.  traZODone (DESYREL) 50 mg tablet TAKE ONE TABLET BY MOUTH AT BEDTIME AS NEEDED        No Known Allergies  Social History     Tobacco Use    Smoking status: Never Smoker    Smokeless tobacco: Never Used   Substance Use Topics    Alcohol use: Never    Drug use: Never     47-year-old lady returns today for follow-up dementia Alzheimer's type. Last visit we started Aricept. She tolerated that well. Her son is with her. No significant change in memory. We discussed starting Namenda    Examination  Visit Vitals  /84 (BP 1 Location: Left upper arm, BP Patient Position: Sitting)   Pulse 75   Resp 12   Ht 5' 6\" (1.676 m)   Wt 76.2 kg (168 lb)   SpO2 100%   BMI 27.12 kg/m²     Pleasant lady.   Awake alert conversant interactive    Impression/Plan  Alzheimer's type dementia  Tolerating Aricept  Start Namenda in the customary fashion for dual therapy and discussed rationale and expectations which is stabilization and slowing of decline    Follow-up in 10 weeks and if she continues to do well we will have her decrease to every 6 month follow-up. Myles Marie MD          This note was created using voice recognition software. Despite editing, there may be syntax errors.

## 2021-06-11 ENCOUNTER — TELEPHONE (OUTPATIENT)
Dept: NEUROLOGY | Age: 71
End: 2021-06-11

## 2021-06-11 DIAGNOSIS — F02.80 LATE ONSET ALZHEIMER'S DISEASE WITHOUT BEHAVIORAL DISTURBANCE (HCC): Primary | ICD-10-CM

## 2021-06-11 DIAGNOSIS — G30.1 LATE ONSET ALZHEIMER'S DISEASE WITHOUT BEHAVIORAL DISTURBANCE (HCC): Primary | ICD-10-CM

## 2021-06-11 RX ORDER — MEMANTINE HYDROCHLORIDE 5 MG/1
TABLET ORAL
Qty: 70 TABLET | Refills: 0 | Status: SHIPPED | OUTPATIENT
Start: 2021-06-11 | End: 2021-08-24 | Stop reason: ALTCHOICE

## 2021-06-11 RX ORDER — MEMANTINE HYDROCHLORIDE 10 MG/1
10 TABLET ORAL 2 TIMES DAILY
Qty: 60 TABLET | Refills: 3 | Status: SHIPPED | OUTPATIENT
Start: 2021-07-05 | End: 2021-08-24 | Stop reason: SDUPTHER

## 2021-06-11 NOTE — TELEPHONE ENCOUNTER
----- Message from [facility] sent at 6/11/2021  9:07 AM EDT -----  Regarding: /Telephone     Caller's first and last name: [facility]  Reason for call:Pt wants to know when her new med for Alzheimers will be submitted.   Callback required yes/no and why:Yes  Best contact number(s):[phone]  Details to clarify the request:n/a

## 2021-07-06 ENCOUNTER — TELEPHONE (OUTPATIENT)
Dept: NEUROLOGY | Age: 71
End: 2021-07-06

## 2021-07-06 NOTE — TELEPHONE ENCOUNTER
----- Message from [facility] sent at 7/6/2021  3:19 PM EDT -----  Regarding: /Refill  Caller (if not patient):Pt  Relationship of caller (if not patient):n/a  Best contact number(s):[phone]  Name of medication and dosage if known:memantine (NAMENDA) 5 mg tablet  Is patient out of this medication (yes/no): No  Pharmacy name:[facility]  Pharmacy listed in chart? (yes/no):Yes  Pharmacy phone [phone]  Date of last visit:6/10/21  Details to clarify the request:n/a

## 2021-08-24 ENCOUNTER — OFFICE VISIT (OUTPATIENT)
Dept: NEUROLOGY | Age: 71
End: 2021-08-24
Payer: MEDICARE

## 2021-08-24 VITALS
HEIGHT: 66 IN | RESPIRATION RATE: 16 BRPM | BODY MASS INDEX: 26.36 KG/M2 | DIASTOLIC BLOOD PRESSURE: 80 MMHG | SYSTOLIC BLOOD PRESSURE: 144 MMHG | WEIGHT: 164 LBS | OXYGEN SATURATION: 100 % | HEART RATE: 82 BPM

## 2021-08-24 DIAGNOSIS — G30.1 LATE ONSET ALZHEIMER'S DISEASE WITHOUT BEHAVIORAL DISTURBANCE (HCC): Primary | ICD-10-CM

## 2021-08-24 DIAGNOSIS — R06.02 SOB (SHORTNESS OF BREATH): ICD-10-CM

## 2021-08-24 DIAGNOSIS — F02.80 LATE ONSET ALZHEIMER'S DISEASE WITHOUT BEHAVIORAL DISTURBANCE (HCC): Primary | ICD-10-CM

## 2021-08-24 PROCEDURE — G8400 PT W/DXA NO RESULTS DOC: HCPCS | Performed by: PSYCHIATRY & NEUROLOGY

## 2021-08-24 PROCEDURE — G8427 DOCREV CUR MEDS BY ELIG CLIN: HCPCS | Performed by: PSYCHIATRY & NEUROLOGY

## 2021-08-24 PROCEDURE — G9899 SCRN MAM PERF RSLTS DOC: HCPCS | Performed by: PSYCHIATRY & NEUROLOGY

## 2021-08-24 PROCEDURE — 3017F COLORECTAL CA SCREEN DOC REV: CPT | Performed by: PSYCHIATRY & NEUROLOGY

## 2021-08-24 PROCEDURE — 99214 OFFICE O/P EST MOD 30 MIN: CPT | Performed by: PSYCHIATRY & NEUROLOGY

## 2021-08-24 PROCEDURE — G8536 NO DOC ELDER MAL SCRN: HCPCS | Performed by: PSYCHIATRY & NEUROLOGY

## 2021-08-24 PROCEDURE — G8510 SCR DEP NEG, NO PLAN REQD: HCPCS | Performed by: PSYCHIATRY & NEUROLOGY

## 2021-08-24 PROCEDURE — G8419 CALC BMI OUT NRM PARAM NOF/U: HCPCS | Performed by: PSYCHIATRY & NEUROLOGY

## 2021-08-24 PROCEDURE — 1090F PRES/ABSN URINE INCON ASSESS: CPT | Performed by: PSYCHIATRY & NEUROLOGY

## 2021-08-24 PROCEDURE — 1101F PT FALLS ASSESS-DOCD LE1/YR: CPT | Performed by: PSYCHIATRY & NEUROLOGY

## 2021-08-24 RX ORDER — MEMANTINE HYDROCHLORIDE 10 MG/1
10 TABLET ORAL 2 TIMES DAILY
Qty: 180 TABLET | Refills: 3 | Status: SHIPPED | OUTPATIENT
Start: 2021-08-24 | End: 2022-02-24 | Stop reason: SDUPTHER

## 2021-08-24 NOTE — PROGRESS NOTES
[facility]ics and [address] at [facility] Neurology Clinics at [address] [facility], [address] [facility] [address] [facility], [address] [place] [address]   [phone]              Chief Complaint   Patient presents with    Follow-up     Alzheimers     Current Outpatient Medications   Medication Sig Dispense Refill    memantine (NAMENDA) 10 mg tablet Take 1 Tablet by mouth two (2) times a day. 60 Tablet 3    acetaminophen (Tylenol Extra Strength) 500 mg tablet Take  by mouth two (2) times a day.  fexofenadine (ALLEGRA) 180 mg tablet Take  by mouth.  donepeziL (Aricept) 10 mg tablet Take 1 Tab by mouth daily. 90 Tab 3    melatonin 10 mg tab Take  by mouth.  aspirin 81 mg chewable tablet Take 81 mg by mouth daily.  cyanocobalamin, vitamin B-12, 2,000 mcg tab Take  by mouth.  cholecalciferol, vitamin D3, 50 mcg (2,000 unit) tab Take  by mouth.  multivitamin (ONE A DAY) tablet Take 1 Tab by mouth daily.  diphenhydrAMINE (BenadryL) 25 mg capsule Take 25 mg by mouth every six (6) hours as needed.  traZODone (DESYREL) 50 mg tablet TAKE ONE TABLET BY MOUTH AT BEDTIME AS NEEDED (Patient not taking: Reported on 8/24/2021)        No Known Allergies  Social History     Tobacco Use    Smoking status: Never Smoker    Smokeless tobacco: Never Used   Substance Use Topics    Alcohol use: Never    Drug use: Never     68-year-old lady returns today with her son for follow-up Alzheimer's type dementia. Last visit with me was in June and at that time we added Namenda to her Aricept. Today she is with her son. She has tolerated the combination of Aricept and Namenda without difficulty. Both of them think her memory may be better. She does have some difficulty with writing out the word on the checks in terms of monetary amounts but nothing major.   She is staying active reading as well as gardening walking and interacting with her neighbor socially. She is having shortness of breath in the mornings primarily. Not with exertion and she walks 7-9000 steps daily. She does not have primary care. She requests referral for primary care as well as pulmonology. Examination  Visit Vitals  BP (!) 156/87 (BP 1 Location: Left upper arm, BP Patient Position: Sitting)   Pulse 82   Resp 16   Ht 5' 6\" (1.676 m)   Wt 74.4 kg (164 lb)   SpO2 100%   BMI 26.47 kg/m²     Pleasant lady. Awake alert oriented and conversant. Normal speech and language. No ataxia    Impression/Plan  Alzheimer's type dementia  Continue Aricept and Namenda    Need for primary care  Referred to [facility]    Shortness of breath question etiology  Refer to pulmonology    Follow-up with me in 6 months    Chris Gunn MD        This note was created using voice recognition software. Despite editing, there may be syntax errors.

## 2022-01-10 ENCOUNTER — TRANSCRIBE ORDER (OUTPATIENT)
Dept: SCHEDULING | Age: 72
End: 2022-01-10

## 2022-01-10 DIAGNOSIS — Z12.31 VISIT FOR SCREENING MAMMOGRAM: Primary | ICD-10-CM

## 2022-02-11 ENCOUNTER — HOSPITAL ENCOUNTER (OUTPATIENT)
Dept: MAMMOGRAPHY | Age: 72
Discharge: HOME | End: 2022-02-11
Attending: FAMILY MEDICINE
Payer: MEDICARE

## 2022-02-11 DIAGNOSIS — Z12.31 VISIT FOR SCREENING MAMMOGRAM: ICD-10-CM

## 2022-02-11 PROCEDURE — 77067 SCR MAMMO BI INCL CAD: CPT

## 2022-02-24 ENCOUNTER — OFFICE VISIT (OUTPATIENT)
Dept: NEUROLOGY | Age: 72
End: 2022-02-24
Payer: MEDICARE

## 2022-02-24 VITALS
TEMPERATURE: 97.5 F | DIASTOLIC BLOOD PRESSURE: 86 MMHG | WEIGHT: 164 LBS | BODY MASS INDEX: 26.47 KG/M2 | RESPIRATION RATE: 18 BRPM | OXYGEN SATURATION: 99 % | SYSTOLIC BLOOD PRESSURE: 171 MMHG | HEART RATE: 78 BPM

## 2022-02-24 DIAGNOSIS — G30.1 LATE ONSET ALZHEIMER'S DISEASE WITHOUT BEHAVIORAL DISTURBANCE (HCC): ICD-10-CM

## 2022-02-24 DIAGNOSIS — F02.80 LATE ONSET ALZHEIMER'S DISEASE WITHOUT BEHAVIORAL DISTURBANCE (HCC): ICD-10-CM

## 2022-02-24 PROCEDURE — 1090F PRES/ABSN URINE INCON ASSESS: CPT | Performed by: PSYCHIATRY & NEUROLOGY

## 2022-02-24 PROCEDURE — G8536 NO DOC ELDER MAL SCRN: HCPCS | Performed by: PSYCHIATRY & NEUROLOGY

## 2022-02-24 PROCEDURE — G8400 PT W/DXA NO RESULTS DOC: HCPCS | Performed by: PSYCHIATRY & NEUROLOGY

## 2022-02-24 PROCEDURE — 99214 OFFICE O/P EST MOD 30 MIN: CPT | Performed by: PSYCHIATRY & NEUROLOGY

## 2022-02-24 PROCEDURE — G9899 SCRN MAM PERF RSLTS DOC: HCPCS | Performed by: PSYCHIATRY & NEUROLOGY

## 2022-02-24 PROCEDURE — G8427 DOCREV CUR MEDS BY ELIG CLIN: HCPCS | Performed by: PSYCHIATRY & NEUROLOGY

## 2022-02-24 PROCEDURE — 1101F PT FALLS ASSESS-DOCD LE1/YR: CPT | Performed by: PSYCHIATRY & NEUROLOGY

## 2022-02-24 PROCEDURE — G8510 SCR DEP NEG, NO PLAN REQD: HCPCS | Performed by: PSYCHIATRY & NEUROLOGY

## 2022-02-24 PROCEDURE — G8419 CALC BMI OUT NRM PARAM NOF/U: HCPCS | Performed by: PSYCHIATRY & NEUROLOGY

## 2022-02-24 PROCEDURE — 3017F COLORECTAL CA SCREEN DOC REV: CPT | Performed by: PSYCHIATRY & NEUROLOGY

## 2022-02-24 RX ORDER — ALBUTEROL SULFATE 90 UG/1
AEROSOL, METERED RESPIRATORY (INHALATION)
COMMUNITY
Start: 2022-02-15

## 2022-02-24 RX ORDER — MEMANTINE HYDROCHLORIDE 10 MG/1
10 TABLET ORAL 2 TIMES DAILY
Qty: 180 TABLET | Refills: 3 | Status: SHIPPED | OUTPATIENT
Start: 2022-02-24 | End: 2022-08-25 | Stop reason: SDUPTHER

## 2022-02-24 RX ORDER — DONEPEZIL HYDROCHLORIDE 10 MG/1
10 TABLET, FILM COATED ORAL DAILY
Qty: 90 TABLET | Refills: 3 | Status: SHIPPED | OUTPATIENT
Start: 2022-02-24 | End: 2022-08-25 | Stop reason: SDUPTHER

## 2022-02-24 NOTE — PATIENT INSTRUCTIONS
List of [facility]   Place of Service  Contact Information    Phone Appt Phone Fax Address   [phone] Not available [phone]  [place]  Location  Contact Information    Phone Appt Phone Fax Address   [phone] Not available [phone] [address] [facility] [address]   Pj Cacereslachelita [address] [place] [address] BE[facility]  Location  Contact Information    Phone Appt Phone Fax Address   [phone] Not available [phone] [address] [place] [address] [facility] [id number] [place] [address]  Place of Service  Contact Information    Phone Appt Phone Fax Address   [phone] Not available 27-21-80-10 [address] [place] [address] [facility]  Department  Contact Information    Phone Fax Address   [phone] [place] [address] [address]

## 2022-02-24 NOTE — PROGRESS NOTES
Chief Complaint   Patient presents with    Alzheimers     some progression, vocabulary seems to be most affected.

## 2022-02-24 NOTE — PROGRESS NOTES
[facility] Clin[facility] and [address] at [facility] Neurology Clinics at 42 [facility], [address] [facility] [address] [facility], [address] [place] [address]   [phone]              Chief Complaint   Patient presents with    Alzheimers     some progression, vocabulary seems to be most affected. Current Outpatient Medications   Medication Sig Dispense Refill    albuterol (PROVENTIL HFA, VENTOLIN HFA, PROAIR HFA) 90 mcg/actuation inhaler       memantine (NAMENDA) 10 mg tablet Take 1 Tablet by mouth two (2) times a day. 180 Tablet 3    acetaminophen (Tylenol Extra Strength) 500 mg tablet Take  by mouth two (2) times a day.  fexofenadine (ALLEGRA) 180 mg tablet Take  by mouth.  donepeziL (Aricept) 10 mg tablet Take 1 Tab by mouth daily. 90 Tab 3    melatonin 10 mg tab Take  by mouth.  aspirin 81 mg chewable tablet Take 81 mg by mouth daily.  cyanocobalamin, vitamin B-12, 2,000 mcg tab Take  by mouth.  cholecalciferol, vitamin D3, 50 mcg (2,000 unit) tab Take  by mouth.  multivitamin (ONE A DAY) tablet Take 1 Tab by mouth daily.  diphenhydrAMINE (BenadryL) 25 mg capsule Take 25 mg by mouth every six (6) hours as needed. No Known Allergies  Social History     Tobacco Use    Smoking status: Never Smoker    Smokeless tobacco: Never Used   Substance Use Topics    Alcohol use: Never    Drug use: Never     75-year-old lady returns today with her son for follow-up Alzheimer's type dementia. Last seen in August.  She is maintained on Aricept and Namenda. She has had some worsening in terms of being able to find the right word or write things down. Otherwise functionally she has done well. Continues to walk daily.   Tolerates her medicine    Examination  Visit Vitals  BP (!) 171/86 (BP 1 Location: Left upper arm, BP Patient Position: Sitting, BP Cuff Size: Adult)   Pulse 78   Temp 97.5 °F (36.4 °C)   Resp 18   Wt 74.4 kg (164 lb)   SpO2 99%   BMI 26.47 kg/m²     Very pleasant well-appearing lady. She is interactive. She knows the correct day date and year. She discusses the invasion of [place] by [place] as well as President [place] Cherry. She calculates the number of quarters in $1.75. She knows the correct floor city and state. Impression/Plan  73-year-old lady with Alzheimer's with mild worsening as noted  Continue to stay active in all spheres  We discussed maybe adding in some [employer] or other games to her reading regimen to help increase cognitive stimulation  Continue Namenda and Aricept  Follow-up in 6 months    Otilio Cockayne, MD        This note was created using voice recognition software. Despite editing, there may be syntax errors.

## 2022-03-19 PROBLEM — G47.09 OTHER INSOMNIA: Status: ACTIVE | Noted: 2020-10-26

## 2022-07-04 RX ORDER — FAMOTIDINE 20 MG/1
TABLET, FILM COATED ORAL
COMMUNITY

## 2022-07-04 RX ORDER — ASPIRIN 81 MG
TABLET, DELAYED RELEASE (ENTERIC COATED) ORAL
COMMUNITY

## 2022-07-04 RX ORDER — ALBUTEROL SULFATE 90 UG/1
AEROSOL, METERED RESPIRATORY (INHALATION)
COMMUNITY

## 2022-07-04 RX ORDER — ASPIRIN 81 MG/1
TABLET, CHEWABLE ORAL
COMMUNITY

## 2022-08-25 ENCOUNTER — OFFICE VISIT (OUTPATIENT)
Dept: NEUROLOGY | Age: 72
End: 2022-08-25
Payer: MEDICARE

## 2022-08-25 VITALS
RESPIRATION RATE: 16 BRPM | HEART RATE: 82 BPM | SYSTOLIC BLOOD PRESSURE: 137 MMHG | HEIGHT: 66 IN | BODY MASS INDEX: 28.45 KG/M2 | DIASTOLIC BLOOD PRESSURE: 84 MMHG | OXYGEN SATURATION: 97 % | WEIGHT: 177 LBS

## 2022-08-25 DIAGNOSIS — F02.80 LATE ONSET ALZHEIMER'S DISEASE WITHOUT BEHAVIORAL DISTURBANCE (HCC): Primary | ICD-10-CM

## 2022-08-25 DIAGNOSIS — G30.1 LATE ONSET ALZHEIMER'S DISEASE WITHOUT BEHAVIORAL DISTURBANCE (HCC): Primary | ICD-10-CM

## 2022-08-25 PROCEDURE — 3017F COLORECTAL CA SCREEN DOC REV: CPT | Performed by: PSYCHIATRY & NEUROLOGY

## 2022-08-25 PROCEDURE — G8419 CALC BMI OUT NRM PARAM NOF/U: HCPCS | Performed by: PSYCHIATRY & NEUROLOGY

## 2022-08-25 PROCEDURE — G8536 NO DOC ELDER MAL SCRN: HCPCS | Performed by: PSYCHIATRY & NEUROLOGY

## 2022-08-25 PROCEDURE — G8510 SCR DEP NEG, NO PLAN REQD: HCPCS | Performed by: PSYCHIATRY & NEUROLOGY

## 2022-08-25 PROCEDURE — 99214 OFFICE O/P EST MOD 30 MIN: CPT | Performed by: PSYCHIATRY & NEUROLOGY

## 2022-08-25 PROCEDURE — G8427 DOCREV CUR MEDS BY ELIG CLIN: HCPCS | Performed by: PSYCHIATRY & NEUROLOGY

## 2022-08-25 PROCEDURE — 1090F PRES/ABSN URINE INCON ASSESS: CPT | Performed by: PSYCHIATRY & NEUROLOGY

## 2022-08-25 PROCEDURE — 1101F PT FALLS ASSESS-DOCD LE1/YR: CPT | Performed by: PSYCHIATRY & NEUROLOGY

## 2022-08-25 PROCEDURE — G9899 SCRN MAM PERF RSLTS DOC: HCPCS | Performed by: PSYCHIATRY & NEUROLOGY

## 2022-08-25 PROCEDURE — G8400 PT W/DXA NO RESULTS DOC: HCPCS | Performed by: PSYCHIATRY & NEUROLOGY

## 2022-08-25 PROCEDURE — 1123F ACP DISCUSS/DSCN MKR DOCD: CPT | Performed by: PSYCHIATRY & NEUROLOGY

## 2022-08-25 RX ORDER — DONEPEZIL HYDROCHLORIDE 10 MG/1
10 TABLET, FILM COATED ORAL DAILY
Qty: 90 TABLET | Refills: 3 | Status: SHIPPED | OUTPATIENT
Start: 2022-08-25

## 2022-08-25 RX ORDER — MEMANTINE HYDROCHLORIDE 10 MG/1
10 TABLET ORAL 2 TIMES DAILY
Qty: 180 TABLET | Refills: 3 | Status: SHIPPED | OUTPATIENT
Start: 2022-08-25

## 2022-08-25 NOTE — PROGRESS NOTES
Chief Complaint   Patient presents with    Alzheimers     6 month follow up     Patient states son is now forgetting how to get to places and her vocabulary seems the same.       Visit Vitals  /84   Pulse 82   Resp 16   Ht 5' 6\" (1.676 m)   Wt 80.3 kg (177 lb)   SpO2 97%   BMI 28.57 kg/m²

## 2022-08-25 NOTE — PROGRESS NOTES
[address] [facility] Neurology Clinics and [address] at [facility] Neurology Clinics at 42 [facility], [address] [place] [address] [address] [facility] [address] [place] [address]   [phone]              Chief Complaint   Patient presents with    Alzheimers     6 month follow up     Current Outpatient Medications   Medication Sig Dispense Refill    albuterol (PROVENTIL HFA, VENTOLIN HFA, PROAIR HFA) 90 mcg/actuation inhaler       memantine (NAMENDA) 10 mg tablet Take 1 Tablet by mouth two (2) times a day. 180 Tablet 3    donepeziL (Aricept) 10 mg tablet Take 1 Tablet by mouth daily. 90 Tablet 3    melatonin 10 mg tab Take  by mouth. aspirin 81 mg chewable tablet Take 81 mg by mouth daily. cyanocobalamin, vitamin B-12, 2,000 mcg tab Take  by mouth. cholecalciferol, vitamin D3, 50 mcg (2,000 unit) tab Take  by mouth.      multivitamin (ONE A DAY) tablet Take 1 Tab by mouth daily. diphenhydrAMINE (BENADRYL) 25 mg capsule Take 25 mg by mouth every six (6) hours as needed. acetaminophen (TYLENOL) 500 mg tablet Take  by mouth two (2) times a day. (Patient not taking: Reported on 8/25/2022)      fexofenadine (ALLEGRA) 180 mg tablet Take  by mouth. (Patient not taking: Reported on 8/25/2022)        No Known Allergies  Social History     Tobacco Use    Smoking status: Never    Smokeless tobacco: Never   Substance Use Topics    Alcohol use: Never    Drug use: Never     73-year-old lady returns today accompanied by her son for follow-up Alzheimer's type dementia. Last visit with me was in February and at that time we maintained Namenda and Aricept. Her son has noted decline. He says that she stays active at home in the garden and she tells me that she walks the neighborhood and talks to people. He has become concerned about her driving. She is unable to remember how to get to his home anymore.   She had an incident where she got lost and had trouble using the GPS. Had trouble using her telephone and got locked out of it. They have taken the code off of the phone now. She now has really narrow down her scope where she is really just goes to [employer] which is about a mile or 2 from her home but her son is even concerned that she may not be able to find the [employer] and she had difficulty finding the auto repair shop which was right next to the [employer]. We discussed resources through the [employer] that could help. He also was concerned as he noticed that she would leave the water running when he was visiting. She says she does not use the stove but he is concerned. Examination  Visit Vitals  /84   Pulse 82   Resp 16   Ht 5' 6\" (1.676 m)   Wt 80.3 kg (177 lb)   SpO2 97%   BMI 28.57 kg/m²     Pleasant well-appearing lady. She is awake and alert. She does not know the date at all not even the month. She knows the current and previous president. She knows around the second floor in [facility]; she calculates well. No ataxia. Steady gait    Impression/Plan  Alzheimer's type dementia progressive  Continue Aricept and Namenda  Increased supervision is needed at this point and we will get her son in contact with the [employer]'s [employer] for additional resources  We discussed not driving is the best course of action at this point    Daisy Lopez MD        This note was created using voice recognition software. Despite editing, there may be syntax errors.

## 2023-02-23 ENCOUNTER — OFFICE VISIT (OUTPATIENT)
Dept: NEUROLOGY | Age: 73
End: 2023-02-23
Payer: MEDICARE

## 2023-02-23 VITALS
DIASTOLIC BLOOD PRESSURE: 79 MMHG | HEART RATE: 80 BPM | OXYGEN SATURATION: 98 % | SYSTOLIC BLOOD PRESSURE: 176 MMHG | RESPIRATION RATE: 16 BRPM

## 2023-02-23 DIAGNOSIS — F02.80 LATE ONSET ALZHEIMER'S DISEASE WITHOUT BEHAVIORAL DISTURBANCE (HCC): ICD-10-CM

## 2023-02-23 DIAGNOSIS — G30.1 LATE ONSET ALZHEIMER'S DISEASE WITHOUT BEHAVIORAL DISTURBANCE (HCC): ICD-10-CM

## 2023-02-23 PROCEDURE — G8417 CALC BMI ABV UP PARAM F/U: HCPCS | Performed by: PSYCHIATRY & NEUROLOGY

## 2023-02-23 PROCEDURE — G8536 NO DOC ELDER MAL SCRN: HCPCS | Performed by: PSYCHIATRY & NEUROLOGY

## 2023-02-23 PROCEDURE — G8400 PT W/DXA NO RESULTS DOC: HCPCS | Performed by: PSYCHIATRY & NEUROLOGY

## 2023-02-23 PROCEDURE — G8510 SCR DEP NEG, NO PLAN REQD: HCPCS | Performed by: PSYCHIATRY & NEUROLOGY

## 2023-02-23 PROCEDURE — 1101F PT FALLS ASSESS-DOCD LE1/YR: CPT | Performed by: PSYCHIATRY & NEUROLOGY

## 2023-02-23 PROCEDURE — 1123F ACP DISCUSS/DSCN MKR DOCD: CPT | Performed by: PSYCHIATRY & NEUROLOGY

## 2023-02-23 PROCEDURE — 1090F PRES/ABSN URINE INCON ASSESS: CPT | Performed by: PSYCHIATRY & NEUROLOGY

## 2023-02-23 PROCEDURE — 3017F COLORECTAL CA SCREEN DOC REV: CPT | Performed by: PSYCHIATRY & NEUROLOGY

## 2023-02-23 PROCEDURE — G9899 SCRN MAM PERF RSLTS DOC: HCPCS | Performed by: PSYCHIATRY & NEUROLOGY

## 2023-02-23 PROCEDURE — G8427 DOCREV CUR MEDS BY ELIG CLIN: HCPCS | Performed by: PSYCHIATRY & NEUROLOGY

## 2023-02-23 PROCEDURE — 99214 OFFICE O/P EST MOD 30 MIN: CPT | Performed by: PSYCHIATRY & NEUROLOGY

## 2023-02-23 RX ORDER — MEMANTINE HYDROCHLORIDE 10 MG/1
10 TABLET ORAL 2 TIMES DAILY
Qty: 180 TABLET | Refills: 3 | Status: SHIPPED | OUTPATIENT
Start: 2023-02-23

## 2023-02-23 RX ORDER — DONEPEZIL HYDROCHLORIDE 10 MG/1
10 TABLET, FILM COATED ORAL DAILY
Qty: 90 TABLET | Refills: 3 | Status: SHIPPED | OUTPATIENT
Start: 2023-02-23

## 2023-02-23 NOTE — PROGRESS NOTES
[facility] Neurology Clinics and [address] at [facility] Neurology Clinics at 42 [facility], [address] [place] [address] [address] [facility], [address] [place] [address]   [phone]              Chief Complaint   Patient presents with    Alzheimers     Current Outpatient Medications   Medication Sig Dispense Refill    memantine (NAMENDA) 10 mg tablet Take 1 Tablet by mouth two (2) times a day. 180 Tablet 3    donepeziL (Aricept) 10 mg tablet Take 1 Tablet by mouth daily. 90 Tablet 3    fexofenadine (ALLEGRA) 180 mg tablet Take  by mouth. aspirin 81 mg chewable tablet Take 81 mg by mouth daily. cyanocobalamin, vitamin B-12, 2,000 mcg tab Take  by mouth. cholecalciferol, vitamin D3, 50 mcg (2,000 unit) tab Take  by mouth.      multivitamin (ONE A DAY) tablet Take 1 Tab by mouth daily. diphenhydrAMINE (BENADRYL) 25 mg capsule Take 25 mg by mouth every six (6) hours as needed. albuterol (PROVENTIL HFA, VENTOLIN HFA, PROAIR HFA) 90 mcg/actuation inhaler  (Patient not taking: Reported on 2/23/2023)      acetaminophen (TYLENOL) 500 mg tablet Take  by mouth two (2) times a day. (Patient not taking: Reported on 8/25/2022)        No Known Allergies  Social History     Tobacco Use    Smoking status: Never    Smokeless tobacco: Never   Substance Use Topics    Alcohol use: Never    Drug use: Never     70-year-old lady returns today for follow-up Alzheimer's type dementia. Has visit with me was August 25, 2022. She tells me she is doing well. She believes her memory is stable. She is tolerating her medicine. She says she stays active walking at least 6000 steps a day. She reads the papers. No one is with her today for collaborative history    Examination  Visit Vitals  BP (!) 176/79 (BP 1 Location: Left upper arm, BP Patient Position: Sitting, BP Cuff Size: Adult)   Pulse 80   Resp 16   SpO2 98%     She looks well.   She is awake and alert.  She cannot tell me the month day date or year. She knows the current president. She has difficulty calculating. No ataxia. Impression/Plan  Progressive Alzheimer's type dementia  Continue Aricept and Namenda  Return in 6 months with family for collaborative history    Ted Valdez MD        This note was created using voice recognition software. Despite editing, there may be syntax errors.

## 2024-09-18 ENCOUNTER — OFFICE VISIT (OUTPATIENT)
Age: 74
End: 2024-09-18
Payer: MEDICARE

## 2024-09-18 VITALS
SYSTOLIC BLOOD PRESSURE: 134 MMHG | TEMPERATURE: 98 F | HEART RATE: 83 BPM | RESPIRATION RATE: 14 BRPM | DIASTOLIC BLOOD PRESSURE: 64 MMHG | OXYGEN SATURATION: 98 %

## 2024-09-18 DIAGNOSIS — R25.9 ABNORMAL INVOLUNTARY MOVEMENTS: Primary | ICD-10-CM

## 2024-09-18 DIAGNOSIS — F02.B0 MODERATE LATE ONSET ALZHEIMER'S DEMENTIA WITHOUT BEHAVIORAL DISTURBANCE, PSYCHOTIC DISTURBANCE, MOOD DISTURBANCE, OR ANXIETY (HCC): ICD-10-CM

## 2024-09-18 DIAGNOSIS — G30.1 MODERATE LATE ONSET ALZHEIMER'S DEMENTIA WITHOUT BEHAVIORAL DISTURBANCE, PSYCHOTIC DISTURBANCE, MOOD DISTURBANCE, OR ANXIETY (HCC): ICD-10-CM

## 2024-09-18 PROCEDURE — 99214 OFFICE O/P EST MOD 30 MIN: CPT | Performed by: PSYCHIATRY & NEUROLOGY

## 2024-09-18 PROCEDURE — G8427 DOCREV CUR MEDS BY ELIG CLIN: HCPCS | Performed by: PSYCHIATRY & NEUROLOGY

## 2024-09-18 PROCEDURE — 4004F PT TOBACCO SCREEN RCVD TLK: CPT | Performed by: PSYCHIATRY & NEUROLOGY

## 2024-09-18 PROCEDURE — G8400 PT W/DXA NO RESULTS DOC: HCPCS | Performed by: PSYCHIATRY & NEUROLOGY

## 2024-09-18 PROCEDURE — 1123F ACP DISCUSS/DSCN MKR DOCD: CPT | Performed by: PSYCHIATRY & NEUROLOGY

## 2024-09-18 PROCEDURE — 1090F PRES/ABSN URINE INCON ASSESS: CPT | Performed by: PSYCHIATRY & NEUROLOGY

## 2024-09-18 PROCEDURE — G8421 BMI NOT CALCULATED: HCPCS | Performed by: PSYCHIATRY & NEUROLOGY

## 2024-09-18 RX ORDER — PHENOBARBITAL 15 MG/1
15 TABLET ORAL 2 TIMES DAILY
COMMUNITY

## 2024-09-18 RX ORDER — LORAZEPAM 1 MG/1
1 TABLET ORAL PRN
COMMUNITY
Start: 2024-08-14

## 2024-09-18 ASSESSMENT — PATIENT HEALTH QUESTIONNAIRE - PHQ9
2. FEELING DOWN, DEPRESSED OR HOPELESS: NOT AT ALL
SUM OF ALL RESPONSES TO PHQ9 QUESTIONS 1 & 2: 0
SUM OF ALL RESPONSES TO PHQ QUESTIONS 1-9: 0
1. LITTLE INTEREST OR PLEASURE IN DOING THINGS: NOT AT ALL

## 2024-09-25 ENCOUNTER — PROCEDURE VISIT (OUTPATIENT)
Age: 74
End: 2024-09-25

## 2024-09-25 DIAGNOSIS — F02.B0 MODERATE LATE ONSET ALZHEIMER'S DEMENTIA WITHOUT BEHAVIORAL DISTURBANCE, PSYCHOTIC DISTURBANCE, MOOD DISTURBANCE, OR ANXIETY (HCC): ICD-10-CM

## 2024-09-25 DIAGNOSIS — R25.9 ABNORMAL INVOLUNTARY MOVEMENTS: Primary | ICD-10-CM

## 2024-09-25 DIAGNOSIS — G30.1 MODERATE LATE ONSET ALZHEIMER'S DEMENTIA WITHOUT BEHAVIORAL DISTURBANCE, PSYCHOTIC DISTURBANCE, MOOD DISTURBANCE, OR ANXIETY (HCC): ICD-10-CM
